# Patient Record
Sex: FEMALE | Race: AMERICAN INDIAN OR ALASKA NATIVE
[De-identification: names, ages, dates, MRNs, and addresses within clinical notes are randomized per-mention and may not be internally consistent; named-entity substitution may affect disease eponyms.]

---

## 2022-06-01 ENCOUNTER — HOSPITAL ENCOUNTER (OUTPATIENT)
Dept: HOSPITAL 5 - TRG | Age: 22
Discharge: HOME | End: 2022-06-01
Attending: OBSTETRICS & GYNECOLOGY
Payer: COMMERCIAL

## 2022-06-01 VITALS — DIASTOLIC BLOOD PRESSURE: 58 MMHG | SYSTOLIC BLOOD PRESSURE: 101 MMHG

## 2022-06-01 DIAGNOSIS — Z3A.38: ICD-10-CM

## 2022-06-01 DIAGNOSIS — E75.5: ICD-10-CM

## 2022-06-01 DIAGNOSIS — O26.893: Primary | ICD-10-CM

## 2022-06-01 LAB
BACTERIA #/AREA URNS HPF: (no result) /HPF
BILIRUB UR QL STRIP: (no result)
BLOOD UR QL VISUAL: (no result)
MUCOUS THREADS #/AREA URNS HPF: (no result) /HPF
PH UR STRIP: 7 [PH] (ref 5–7)
PROT UR STRIP-MCNC: (no result) MG/DL
RBC #/AREA URNS HPF: 3 /HPF (ref 0–6)
UROBILINOGEN UR-MCNC: < 2 MG/DL (ref ?–2)
WBC #/AREA URNS HPF: 2 /HPF (ref 0–6)

## 2022-06-01 PROCEDURE — 81001 URINALYSIS AUTO W/SCOPE: CPT

## 2022-06-01 PROCEDURE — 59025 FETAL NON-STRESS TEST: CPT

## 2022-06-07 ENCOUNTER — HOSPITAL ENCOUNTER (OUTPATIENT)
Dept: HOSPITAL 5 - TRG | Age: 22
Discharge: HOME | End: 2022-06-07
Attending: OBSTETRICS & GYNECOLOGY
Payer: COMMERCIAL

## 2022-06-07 VITALS — DIASTOLIC BLOOD PRESSURE: 65 MMHG | SYSTOLIC BLOOD PRESSURE: 106 MMHG

## 2022-06-07 DIAGNOSIS — Z3A.39: ICD-10-CM

## 2022-06-07 DIAGNOSIS — O26.853: Primary | ICD-10-CM

## 2022-06-07 PROCEDURE — 59025 FETAL NON-STRESS TEST: CPT

## 2022-06-07 PROCEDURE — 76815 OB US LIMITED FETUS(S): CPT

## 2022-06-07 PROCEDURE — 76819 FETAL BIOPHYS PROFIL W/O NST: CPT

## 2022-06-07 NOTE — ULTRASOUND REPORT
ULTRASOUND OBSTETRIC LIMITED 

ULTRASOUND FETAL BIOPHYSICAL PROFILE



INDICATION / CLINICAL INFORMATION: fetal well being.

Clinical Gestational Age (GA) in weeks, days: 39, 2 



TECHNIQUE: Transabdominal.



COMPARISON: None available.



FINDINGS:



FETAL BREATHING MOVEMENT = 2

GROSS BODY MOVEMENT = 2

FETAL TONE = 2

QUALITATIVE AMNIOTIC FLUID VOLUME = 2



TOTAL BIOPHYSICAL SCORE = 8/8



FETAL HEART RATE (beats per minute): 141 



AMNIOTIC FLUID INDEX (cm) =  11.6   (normal = 7-24 cm)

PRESENTATION: Cephalic. 



ADDITIONAL FINDINGS: None.



IMPRESSION:

1. Fetal Biophysical Score = 8/8



Signer Name: Wyatt Gray MD 

Signed: 6/7/2022 4:40 PM

Workstation Name: [email or web address]

## 2022-06-07 NOTE — ULTRASOUND REPORT
ULTRASOUND OBSTETRIC LIMITED 

ULTRASOUND FETAL BIOPHYSICAL PROFILE



INDICATION / CLINICAL INFORMATION: fetal well being.

Clinical Gestational Age (GA) in weeks, days: 39, 2 



TECHNIQUE: Transabdominal.



COMPARISON: None available.



FINDINGS:



FETAL BREATHING MOVEMENT = 2

GROSS BODY MOVEMENT = 2

FETAL TONE = 2

QUALITATIVE AMNIOTIC FLUID VOLUME = 2



TOTAL BIOPHYSICAL SCORE = 8/8



FETAL HEART RATE (beats per minute): 141 



AMNIOTIC FLUID INDEX (cm) =  11.6   (normal = 7-24 cm)

PRESENTATION: Cephalic. 



ADDITIONAL FINDINGS: None.



IMPRESSION:

1. Fetal Biophysical Score = 8/8



Signer Name: Wyatt Gray MD 

Signed: 6/7/2022 4:40 PM

Workstation Name: [id number]

## 2022-06-14 ENCOUNTER — HOSPITAL ENCOUNTER (OUTPATIENT)
Dept: HOSPITAL 5 - TRG | Age: 22
LOS: 1 days | Discharge: HOME | End: 2022-06-15
Attending: OBSTETRICS & GYNECOLOGY
Payer: COMMERCIAL

## 2022-06-14 DIAGNOSIS — O48.0: ICD-10-CM

## 2022-06-14 DIAGNOSIS — O99.513: ICD-10-CM

## 2022-06-14 DIAGNOSIS — O99.013: ICD-10-CM

## 2022-06-14 DIAGNOSIS — D64.9: ICD-10-CM

## 2022-06-14 DIAGNOSIS — J45.909: ICD-10-CM

## 2022-06-14 DIAGNOSIS — Z3A.40: ICD-10-CM

## 2022-06-15 VITALS — DIASTOLIC BLOOD PRESSURE: 58 MMHG | SYSTOLIC BLOOD PRESSURE: 104 MMHG

## 2022-06-19 ENCOUNTER — HOSPITAL ENCOUNTER (INPATIENT)
Dept: HOSPITAL 5 - TRG | Age: 22
LOS: 3 days | Discharge: HOME | End: 2022-06-22
Attending: OBSTETRICS & GYNECOLOGY | Admitting: OBSTETRICS & GYNECOLOGY
Payer: COMMERCIAL

## 2022-06-19 DIAGNOSIS — O48.0: ICD-10-CM

## 2022-06-19 DIAGNOSIS — Z91.013: ICD-10-CM

## 2022-06-19 DIAGNOSIS — Z91.048: ICD-10-CM

## 2022-06-19 DIAGNOSIS — Z91.040: ICD-10-CM

## 2022-06-19 DIAGNOSIS — Z20.822: ICD-10-CM

## 2022-06-19 DIAGNOSIS — Z3A.41: ICD-10-CM

## 2022-06-19 DIAGNOSIS — F32.9: ICD-10-CM

## 2022-06-19 DIAGNOSIS — Z91.018: ICD-10-CM

## 2022-06-19 LAB
BASOPHILS # (AUTO): 0 K/MM3 (ref 0–0.1)
BASOPHILS NFR BLD AUTO: 0.6 % (ref 0–1.8)
EOSINOPHIL # BLD AUTO: 0.1 K/MM3 (ref 0–0.4)
EOSINOPHIL NFR BLD AUTO: 1 % (ref 0–4.3)
HCT VFR BLD CALC: 36 % (ref 30.3–42.9)
HCT VFR BLD CALC: 37.1 % (ref 30.3–42.9)
HGB BLD-MCNC: 11.8 GM/DL (ref 10.1–14.3)
HGB BLD-MCNC: 11.9 GM/DL (ref 10.1–14.3)
LYMPHOCYTES # BLD AUTO: 1.6 K/MM3 (ref 1.2–5.4)
LYMPHOCYTES NFR BLD AUTO: 29.2 % (ref 13.4–35)
MCHC RBC AUTO-ENTMCNC: 32 % (ref 30–34)
MCHC RBC AUTO-ENTMCNC: 33 % (ref 30–34)
MCV RBC AUTO: 86 FL (ref 79–97)
MCV RBC AUTO: 87 FL (ref 79–97)
MONOCYTES # (AUTO): 0.6 K/MM3 (ref 0–0.8)
MONOCYTES % (AUTO): 11.5 % (ref 0–7.3)
PLATELET # BLD: 196 K/MM3 (ref 140–440)
PLATELET # BLD: 200 K/MM3 (ref 140–440)
RBC # BLD AUTO: 4.19 M/MM3 (ref 3.65–5.03)
RBC # BLD AUTO: 4.28 M/MM3 (ref 3.65–5.03)

## 2022-06-19 PROCEDURE — 85014 HEMATOCRIT: CPT

## 2022-06-19 PROCEDURE — 90715 TDAP VACCINE 7 YRS/> IM: CPT

## 2022-06-19 PROCEDURE — 86900 BLOOD TYPING SEROLOGIC ABO: CPT

## 2022-06-19 PROCEDURE — 59025 FETAL NON-STRESS TEST: CPT

## 2022-06-19 PROCEDURE — 36415 COLL VENOUS BLD VENIPUNCTURE: CPT

## 2022-06-19 PROCEDURE — 86901 BLOOD TYPING SEROLOGIC RH(D): CPT

## 2022-06-19 PROCEDURE — U0003 INFECTIOUS AGENT DETECTION BY NUCLEIC ACID (DNA OR RNA); SEVERE ACUTE RESPIRATORY SYNDROME CORONAVIRUS 2 (SARS-COV-2) (CORONAVIRUS DISEASE [COVID-19]), AMPLIFIED PROBE TECHNIQUE, MAKING USE OF HIGH THROUGHPUT TECHNOLOGIES AS DESCRIBED BY CMS-2020-01-R: HCPCS

## 2022-06-19 PROCEDURE — 99211 OFF/OP EST MAY X REQ PHY/QHP: CPT

## 2022-06-19 PROCEDURE — 85018 HEMOGLOBIN: CPT

## 2022-06-19 PROCEDURE — 86592 SYPHILIS TEST NON-TREP QUAL: CPT

## 2022-06-19 PROCEDURE — 96360 HYDRATION IV INFUSION INIT: CPT

## 2022-06-19 PROCEDURE — G0463 HOSPITAL OUTPT CLINIC VISIT: HCPCS

## 2022-06-19 PROCEDURE — 86850 RBC ANTIBODY SCREEN: CPT

## 2022-06-19 PROCEDURE — 85025 COMPLETE CBC W/AUTO DIFF WBC: CPT

## 2022-06-19 PROCEDURE — 85027 COMPLETE CBC AUTOMATED: CPT

## 2022-06-19 NOTE — HISTORY AND PHYSICAL REPORT
History of Present Illness


Date of examination: 22


Chief complaint: 


41 wks; contractions, "feeling wet down there"





History of present illness: 


EDC Calculations by LMP: 2022








Past Medical History:


   Reviewed history from 2021 and no changes required:


      Anemia - takes iron


      Asthma - exercise-induced


      Mirgraines - f/u with neurologist





Past Surgical History:


   Reviewed history from 2021 and no changes required:


      negative

















Risk Factors: 


Smoked Tobacco Use:  Never smoker


Smokeless Tobacco Use:  Never


HIV High Risk Behavior:  no


Seatbelt Use:  100 %





No Dietary Counseling Reason: pn yes





Alcohol Use:  no





Drug Use:  yes


   Drug of Choice:  marijuana


   Comments/Other Substances:  Occasional use 








Past Medical History 


Anesthesia Complications: negative


Anemia: negative


Autoimmune Disorder: negative


Bleeding Disorder: negative


Blood Transfusions: negative


Breast Disease: negative


Diabetes: negative


Heart Disease: negative


Hypertension: negative


Hepatitis/Liver Disease: negative


Kidney Disease/UTI: negative


Neurologic/Epilepsy/Migraines: negative


Phlebitis/Varicosities: negative


Psychiatric: negative


Pulmonary Disease/Asthma: negative


Thyroid Disease: negative


Hospitalizations: negative


Surgery (Non-gyn): negative


Abnormal PAP: negative


Uterine Anomaly: negative


Uterine Surgery (not C/S): negative


Other Gynecologic Problems: negative





Social Hx: Patient is single





Smoking History:


Patient has never smoked.








Infection History 


Hx of STD: chlamydia


HIV Risk Eval: no


Personal hx. of genital herpes: no


Infection History Comments: Also notes h/o gonorrhea





Genetic History 


 Congenital Heart Defect:


    Mom: no


Canavan Disease:


    Mom: no


Thalassemia


    Mom: no


Neural Tube Defect


    Mom: no


Down's Syndrome


    Mom: no


Shiva-Sachs


    Mom: no


Sickle Cell Disease/Trait


    Mom: no


Hemophilia


    Mom: no


Muscular Dystrophy


    Mom: no


Cystic Fibrosis


    Mom: no


[place] Chorea


    Mom: no


Mental Retardation


    Mom: no


Fragile X


    Mom: no


Other Genetic/Chromosomal Disorder


    Mom: no


Child w/other birth defect


    Mom: no


Active Medications:


EYE DROPS () 


SINGULAIR TABLET (MONTELUKAST SODIUM TABS) 


FLONASE SENSIMIST SUSPENSION (FLUTICASONE FUROATE SUSP) 


AMITRIPTYLINE HCL TABLET (AMITRIPTYLINE HCL TABS) 


HYDROXYZINE HCL TABLET (HYDROXYZINE HCL TABS) 


ZOLOFT 50 MG ORAL TABLET (SERTRALINE HCL) 





Current Allergies:


* SHELLFISH (Critical)


* WATERMELON (Critical)


* NUTS (Critical)


* MUSTARD (Critical)


* LATEX (Critical)








Past History


Past Medical History: other (see HPI)


Past Surgical History: other (see HPI)


GYN History: other (see HPI)


Family/Genetic History: other (see HPI)


Social history: no significant social history





- Obstetrical History


Expected Date of Delivery: 22


Actual Gestation: 41 Week(s) 0 Day(s) 


: 1


Para: 0


Hx # Term Pregnancies: 0


Number of  Pregnancies: 0


Spontaneous Abortions: 0


Induced : 0


Number of Living Children: 0





Medications and Allergies


                                    Allergies











Allergy/AdvReac Type Severity Reaction Status Date / Time


 


Latex, Natural Rubber AdvReac Severe Anaphylaxis Verified 22 13:14











                                Home Medications











 Medication  Instructions  Recorded  Confirmed  Last Taken  Type


 


HYDROcodone/APAP 5-325 [Antoine 1 each PO Q6HR PRN #10 tablet 14  Unknown Rx





5-325 mg TAB]     


 


Ondansetron [Zofran Odt] 4 mg PO Q6H PRN #8 tab.rapdis 14  Unknown Rx











Active Meds: 


Active Medications





Acetaminophen (Acetaminophen 325 Mg Tab)  650 mg PO Q4H PRN


   PRN Reason: Pain, Mild (1-3)


Carboprost Tromethamine (Carboprost Tromethamine 250 Mcg/1 Ml Inj)  250 mcg IM 

ONCE PRN


   PRN Reason: Uterine Bleeding


Ephedrine Sulfate (Ephedrine Sulfate 50 Mg/1 Ml Inj)  10 mg IV Q2M PRN


   PRN Reason: Hypotension


Hydroxyzine Pamoate (Hydroxyzine Pamoate 50 Mg Cap)  100 mg PO ONCE ONE


   Stop: 22 09:59


Oxytocin/Sodium Chloride (Pitocin/Ns 30 Unit/500ml)  30 units in 500 mls @ 2 

mls/hr IV TITR JOHANA; Protocol


Lactated Ringer's (Lactated Ringers)  1,000 mls @ 125 mls/hr IV AS DIRECT JOHANA


Oxytocin/Sodium Chloride (Pitocin/Ns 30 Unit/500ml)  30 units in 500 mls @ 40 

mls/hr IV TITR JOHANA; Protocol


Lidocaine (Lidocaine (2%) 20 Mg/1 Ml Vial 20 Ml Mdv)  20 ml INFILTRATI ONCE ONE


   Stop: 22 09:51


Loperamide HCl (Loperamide 2 Mg Cap)  2 mg PO ONCE PRN


   PRN Reason: give with Hemabate


Methylergonovine Maleate (Methylergonovine Maleate 0.2 Mg/Ml Vial)  0.2 mg IM 

ONCE PRN


   PRN Reason: Uterine Bleeding


Mineral Oil (Mineral Oil 30 Ml Oral Liqd)  30 ml PO QHS PRN


   PRN Reason: Constipation


Misoprostol (Misoprostol 200 Mcg Tab)  800 mcg GA ONCE PRN


   PRN Reason: Uterine Bleeding


Ondansetron HCl (Ondansetron 4 Mg/2 Ml Inj)  4 mg IV Q8H PRN


   PRN Reason: Nausea And Vomiting


Oxytocin (Oxytocin 10 Unit/1 Ml Inj)  10 unit IM ONCE PRN


   PRN Reason: Uterine Bleeding


Terbutaline Sulfate (Terbutaline 1 Mg/1 Ml Inj)  0.25 mg SUB-Q ONCE PRN


   PRN Reason: Hyperstimulation/Hypertonicity











Review of Systems


All systems: negative





- Vital Signs


Vital signs: 


                                   Vital Signs











Pulse BP Pulse Ox


 


 90   103/71   97 


 


 22 05:39  22 05:39  22 05:39








                                        











Temp Pulse Resp BP Pulse Ox


 


 97.9 F   92 H     106/69   89 


 


 22 05:43  22 09:56     22 09:26  22 09:56














- Physical Exam


Breasts: Positive: normal


Cardiovascular: Regular rate


Lungs: Positive: Normal air movement


Abdomen: Positive: normal appearance, soft


Genitourinary (Female): Positive: normal external genitalia, normal perenium


Vulva: both: normal


Vagina: Positive: normal moisture


Uterus: Positive: normal size


Anus/Rectum: Positive: normal perianal skin


Extremities: Positive: normal





- Obstetrical


FHR: category 1


Uterine Contraction Monitor Mode: External


Cervical Dilatation: 0


Uterine Contraction Pattern: Irregular


Uterine Tone Measurement Phase: Contraction


Uterine Contraction Intensity: Mild





Results


Result Diagrams: 


                                 22 10:37





All other labs normal.








Assessment and Plan


 21y/o  presented for a labor check @ 41+0 weeks. she is scheduled for IOL 

tomorrow. SVE not laboring at this time, no leaking noted. Ctx are present in a 

semi-regular pattern occurring every 2-5 minutes.  Pt to be admitted for 

monitoring until which time staffing can accommodate her IOL. pt ok to have 

regular diet and ambulateif she desires as long as NST reactive. all questions 

addressed, pt verbalizes understanding.








- Patient Problems


(1) Post term pregnancy, 41 weeks


Current Visit: Yes   Status: Acute

## 2022-06-20 RX ADMIN — FENTANYL CITRATE PRN MCG: 50 INJECTION, SOLUTION INTRAMUSCULAR; INTRAVENOUS at 18:46

## 2022-06-20 RX ADMIN — AMPICILLIN SODIUM SCH: 1 INJECTION, POWDER, FOR SOLUTION INTRAMUSCULAR; INTRAVENOUS at 21:20

## 2022-06-20 RX ADMIN — AMPICILLIN SODIUM SCH: 1 INJECTION, POWDER, FOR SOLUTION INTRAMUSCULAR; INTRAVENOUS at 21:21

## 2022-06-20 RX ADMIN — FENTANYL CITRATE PRN MCG: 50 INJECTION, SOLUTION INTRAMUSCULAR; INTRAVENOUS at 06:33

## 2022-06-20 RX ADMIN — AMPICILLIN SODIUM SCH MLS/HR: 1 INJECTION, POWDER, FOR SOLUTION INTRAMUSCULAR; INTRAVENOUS at 21:21

## 2022-06-20 RX ADMIN — FENTANYL CITRATE PRN MCG: 50 INJECTION, SOLUTION INTRAMUSCULAR; INTRAVENOUS at 02:03

## 2022-06-20 RX ADMIN — AMPICILLIN SODIUM SCH MLS/HR: 1 INJECTION, POWDER, FOR SOLUTION INTRAMUSCULAR; INTRAVENOUS at 16:44

## 2022-06-20 NOTE — PROGRESS NOTE
Assessment and Plan


A: 22 y.o. @ 41.l, IOL d/t postdates. Meconium stained fluid. 








- Patient Problems


(1) Post term pregnancy, 41 weeks


Current Visit: Yes   Status: Acute   


Plan to address problem: 


Continue with IOL.


 [facility] team for delivery d/t mec stained fluid at [facility]. 


 Anticipate . 








Subjective





- Subjective


Date of service: 22


Principal diagnosis: IOL, 41.1 wks


Interval history: 


Pt comfortable with epidural. 





Patient reports: other (Vaginal pressure)





Objective





- Vital Signs


Vital Signs: 


                               Vital Signs - 12hr











  22





  10:07 10:08 10:13


 


Temperature   


 


Pulse Rate  95 H 87


 


Respiratory 16  





Rate   


 


Blood Pressure   


 


Blood Pressure   





[Right]   


 


O2 Sat by Pulse  100 98





Oximetry   


 


O2 Sat by Pulse   





Oximetry [   





Anterior   





Bilateral   





Throughout]   














  22





  10:14 10:15 10:18


 


Temperature   


 


Pulse Rate 84 90 93 H


 


Respiratory   





Rate   


 


Blood Pressure 112/77  


 


Blood Pressure   





[Right]   


 


O2 Sat by Pulse  94 97





Oximetry   


 


O2 Sat by Pulse   





Oximetry [   





Anterior   





Bilateral   





Throughout]   














  22





  10:23 10:24 10:28


 


Temperature   


 


Pulse Rate 75 74 82


 


Respiratory   





Rate   


 


Blood Pressure   


 


Blood Pressure   





[Right]   


 


O2 Sat by Pulse 94 93 98





Oximetry   


 


O2 Sat by Pulse   





Oximetry [   





Anterior   





Bilateral   





Throughout]   














  22





  10:29 10:30 10:33


 


Temperature   


 


Pulse Rate 80 83 73


 


Respiratory   





Rate   


 


Blood Pressure 114/85  


 


Blood Pressure   





[Right]   


 


O2 Sat by Pulse  94 95





Oximetry   


 


O2 Sat by Pulse   





Oximetry [   





Anterior   





Bilateral   





Throughout]   














  22





  10:38 10:43 10:45


 


Temperature   


 


Pulse Rate 88 81 73


 


Respiratory   





Rate   


 


Blood Pressure   107/66


 


Blood Pressure   





[Right]   


 


O2 Sat by Pulse 97 97 92





Oximetry   


 


O2 Sat by Pulse   





Oximetry [   





Anterior   





Bilateral   





Throughout]   














  22





  10:48 10:53 10:56


 


Temperature   


 


Pulse Rate 63 84 77


 


Respiratory   





Rate   


 


Blood Pressure   


 


Blood Pressure   





[Right]   


 


O2 Sat by Pulse 98 99 92





Oximetry   


 


O2 Sat by Pulse   





Oximetry [   





Anterior   





Bilateral   





Throughout]   














  22





  10:58 11:00 11:03


 


Temperature   


 


Pulse Rate 73 69 76


 


Respiratory   





Rate   


 


Blood Pressure  107/64 


 


Blood Pressure   





[Right]   


 


O2 Sat by Pulse 97  96





Oximetry   


 


O2 Sat by Pulse   





Oximetry [   





Anterior   





Bilateral   





Throughout]   














  22





  11:08 11:09 11:14


 


Temperature   


 


Pulse Rate 89  


 


Respiratory   





Rate   


 


Blood Pressure   


 


Blood Pressure   





[Right]   


 


O2 Sat by Pulse 98 81 L 84





Oximetry   


 


O2 Sat by Pulse   





Oximetry [   





Anterior   





Bilateral   





Throughout]   














  22





  11:15 11:20 11:25


 


Temperature   


 


Pulse Rate 111 H 67 71


 


Respiratory   





Rate   


 


Blood Pressure   


 


Blood Pressure   





[Right]   


 


O2 Sat by Pulse 88 89 95





Oximetry   


 


O2 Sat by Pulse   





Oximetry [   





Anterior   





Bilateral   





Throughout]   














  22





  11:26 11:30 11:32


 


Temperature   


 


Pulse Rate 77 72 71


 


Respiratory   





Rate   


 


Blood Pressure  108/64 


 


Blood Pressure   





[Right]   


 


O2 Sat by Pulse 94 96 94





Oximetry   


 


O2 Sat by Pulse   





Oximetry [   





Anterior   





Bilateral   





Throughout]   














  22





  11:35 11:40 11:45


 


Temperature   


 


Pulse Rate 79 84 69


 


Respiratory   





Rate   


 


Blood Pressure   


 


Blood Pressure   





[Right]   


 


O2 Sat by Pulse 97 97 98





Oximetry   


 


O2 Sat by Pulse   





Oximetry [   





Anterior   





Bilateral   





Throughout]   














  22





  11:46 11:50 11:55


 


Temperature   


 


Pulse Rate 67 77 85


 


Respiratory   





Rate   


 


Blood Pressure 108/67  


 


Blood Pressure   





[Right]   


 


O2 Sat by Pulse  99 97





Oximetry   


 


O2 Sat by Pulse   





Oximetry [   





Anterior   





Bilateral   





Throughout]   














  22





  11:59 12:00 12:03


 


Temperature  97.4 F L 


 


Pulse Rate 75 71 73


 


Respiratory   





Rate   


 


Blood Pressure 114/72  


 


Blood Pressure   





[Right]   


 


O2 Sat by Pulse  96 93





Oximetry   


 


O2 Sat by Pulse   





Oximetry [   





Anterior   





Bilateral   





Throughout]   














  22





  12:05 12:10 12:15


 


Temperature   


 


Pulse Rate 74 80 88


 


Respiratory   





Rate   


 


Blood Pressure   127/76


 


Blood Pressure   





[Right]   


 


O2 Sat by Pulse 96 99 98





Oximetry   


 


O2 Sat by Pulse   





Oximetry [   





Anterior   





Bilateral   





Throughout]   














  22





  12:20 12:26 12:31


 


Temperature   


 


Pulse Rate 86 90 62


 


Respiratory   





Rate   


 


Blood Pressure   


 


Blood Pressure   





[Right]   


 


O2 Sat by Pulse 98 85 61 L





Oximetry   


 


O2 Sat by Pulse   





Oximetry [   





Anterior   





Bilateral   





Throughout]   














  22





  12:32 12:37 12:42


 


Temperature   


 


Pulse Rate 81 82 79


 


Respiratory   





Rate   


 


Blood Pressure   


 


Blood Pressure   





[Right]   


 


O2 Sat by Pulse 96 100 97





Oximetry   


 


O2 Sat by Pulse   





Oximetry [   





Anterior   





Bilateral   





Throughout]   














  22





  12:45 12:47 12:52


 


Temperature   


 


Pulse Rate 73 76 79


 


Respiratory   





Rate   


 


Blood Pressure 113/66  


 


Blood Pressure   





[Right]   


 


O2 Sat by Pulse  97 97





Oximetry   


 


O2 Sat by Pulse   





Oximetry [   





Anterior   





Bilateral   





Throughout]   














  22





  12:57 12:59 13:02


 


Temperature   


 


Pulse Rate 72 74 71


 


Respiratory   





Rate   


 


Blood Pressure  114/71 


 


Blood Pressure   





[Right]   


 


O2 Sat by Pulse 98  98





Oximetry   


 


O2 Sat by Pulse   





Oximetry [   





Anterior   





Bilateral   





Throughout]   














  22





  13:07 13:12 13:14


 


Temperature   


 


Pulse Rate 76 75 82


 


Respiratory   





Rate   


 


Blood Pressure   119/77


 


Blood Pressure   





[Right]   


 


O2 Sat by Pulse 100 94 





Oximetry   


 


O2 Sat by Pulse   





Oximetry [   





Anterior   





Bilateral   





Throughout]   














  22





  13:17 13:22 13:27


 


Temperature   


 


Pulse Rate 95 H 88 88


 


Respiratory   





Rate   


 


Blood Pressure   


 


Blood Pressure   





[Right]   


 


O2 Sat by Pulse 99 97 98





Oximetry   


 


O2 Sat by Pulse   





Oximetry [   





Anterior   





Bilateral   





Throughout]   














  22





  13:29 13:32 13:37


 


Temperature   


 


Pulse Rate 85 74 84


 


Respiratory   





Rate   


 


Blood Pressure 117/79  


 


Blood Pressure   





[Right]   


 


O2 Sat by Pulse  97 98





Oximetry   


 


O2 Sat by Pulse   





Oximetry [   





Anterior   





Bilateral   





Throughout]   














  22





  13:42 13:45 13:47


 


Temperature   


 


Pulse Rate 94 H 89 94 H


 


Respiratory   





Rate   


 


Blood Pressure  119/84 


 


Blood Pressure   





[Right]   


 


O2 Sat by Pulse 98  98





Oximetry   


 


O2 Sat by Pulse   





Oximetry [   





Anterior   





Bilateral   





Throughout]   














  22





  13:52 13:57 13:59


 


Temperature   


 


Pulse Rate 89 82 85


 


Respiratory   





Rate   


 


Blood Pressure   111/74


 


Blood Pressure   





[Right]   


 


O2 Sat by Pulse 98 98 





Oximetry   


 


O2 Sat by Pulse   





Oximetry [   





Anterior   





Bilateral   





Throughout]   














  22





  14:02 14:07 14:12


 


Temperature   


 


Pulse Rate 81 86 85


 


Respiratory   





Rate   


 


Blood Pressure   


 


Blood Pressure   





[Right]   


 


O2 Sat by Pulse 98 97 97





Oximetry   


 


O2 Sat by Pulse   





Oximetry [   





Anterior   





Bilateral   





Throughout]   














  22





  14:17 14:22 14:27


 


Temperature   


 


Pulse Rate 78 75 76


 


Respiratory   





Rate   


 


Blood Pressure   


 


Blood Pressure   





[Right]   


 


O2 Sat by Pulse 97 97 97





Oximetry   


 


O2 Sat by Pulse   





Oximetry [   





Anterior   





Bilateral   





Throughout]   














  22





  14:30 14:32 14:37


 


Temperature   


 


Pulse Rate 75 69 74


 


Respiratory   





Rate   


 


Blood Pressure 108/67  


 


Blood Pressure   





[Right]   


 


O2 Sat by Pulse  97 96





Oximetry   


 


O2 Sat by Pulse   





Oximetry [   





Anterior   





Bilateral   





Throughout]   














  22





  14:48 14:53 14:58


 


Temperature   


 


Pulse Rate 80 75 74


 


Respiratory   





Rate   


 


Blood Pressure   


 


Blood Pressure   





[Right]   


 


O2 Sat by Pulse 98 97 97





Oximetry   


 


O2 Sat by Pulse   





Oximetry [   





Anterior   





Bilateral   





Throughout]   














  22





  15:00 15:03 15:08


 


Temperature   


 


Pulse Rate 68 71 68


 


Respiratory   





Rate   


 


Blood Pressure 105/63  


 


Blood Pressure   





[Right]   


 


O2 Sat by Pulse  97 97





Oximetry   


 


O2 Sat by Pulse   





Oximetry [   





Anterior   





Bilateral   





Throughout]   














  22





  15:13 15:18 15:23


 


Temperature   


 


Pulse Rate 71 71 68


 


Respiratory   





Rate   


 


Blood Pressure   


 


Blood Pressure   





[Right]   


 


O2 Sat by Pulse 98 98 97





Oximetry   


 


O2 Sat by Pulse   





Oximetry [   





Anterior   





Bilateral   





Throughout]   














  22





  15:28 15:30 15:33


 


Temperature   


 


Pulse Rate 72 71 74


 


Respiratory   





Rate   


 


Blood Pressure  115/65 


 


Blood Pressure   





[Right]   


 


O2 Sat by Pulse 98  97





Oximetry   


 


O2 Sat by Pulse   





Oximetry [   





Anterior   





Bilateral   





Throughout]   














  22





  15:38 15:43 15:48


 


Temperature   


 


Pulse Rate 64 76 70


 


Respiratory   





Rate   


 


Blood Pressure   


 


Blood Pressure   





[Right]   


 


O2 Sat by Pulse 98 98 97





Oximetry   


 


O2 Sat by Pulse   





Oximetry [   





Anterior   





Bilateral   





Throughout]   














  06/22





  15:53 16:02 16:04


 


Temperature   


 


Pulse Rate 76 82 83


 


Respiratory   





Rate   


 


Blood Pressure   111/68


 


Blood Pressure   





[Right]   


 


O2 Sat by Pulse 98 99 





Oximetry   


 


O2 Sat by Pulse   





Oximetry [   





Anterior   





Bilateral   





Throughout]   














  22





  16:07 16:12 16:17


 


Temperature 98.2 F  


 


Pulse Rate 77 78 74


 


Respiratory 18  





Rate   


 


Blood Pressure   


 


Blood Pressure 111/68  





[Right]   


 


O2 Sat by Pulse 98 98 97





Oximetry   


 


O2 Sat by Pulse   





Oximetry [   





Anterior   





Bilateral   





Throughout]   














  22





  16:22 16:27 16:31


 


Temperature   


 


Pulse Rate 68 71 73


 


Respiratory   





Rate   


 


Blood Pressure   111/65


 


Blood Pressure   





[Right]   


 


O2 Sat by Pulse 97 98 





Oximetry   


 


O2 Sat by Pulse   





Oximetry [   





Anterior   





Bilateral   





Throughout]   














  22





  16:32 16:37 16:42


 


Temperature   


 


Pulse Rate 69 76 95 H


 


Respiratory   





Rate   


 


Blood Pressure   


 


Blood Pressure   





[Right]   


 


O2 Sat by Pulse 98 97 99





Oximetry   


 


O2 Sat by Pulse   





Oximetry [   





Anterior   





Bilateral   





Throughout]   














  22





  16:47 16:52 16:57


 


Temperature   


 


Pulse Rate 74 86 79


 


Respiratory   





Rate   


 


Blood Pressure   


 


Blood Pressure   





[Right]   


 


O2 Sat by Pulse 98 98 97





Oximetry   


 


O2 Sat by Pulse   





Oximetry [   





Anterior   





Bilateral   





Throughout]   














  22





  17:12 17:17 17:22


 


Temperature   


 


Pulse Rate 87 70 79


 


Respiratory   





Rate   


 


Blood Pressure   


 


Blood Pressure   





[Right]   


 


O2 Sat by Pulse 97 97 98





Oximetry   


 


O2 Sat by Pulse   





Oximetry [   





Anterior   





Bilateral   





Throughout]   














  22





  17:27 17:30 17:32


 


Temperature   


 


Pulse Rate 78 77 87


 


Respiratory   





Rate   


 


Blood Pressure  112/64 


 


Blood Pressure   





[Right]   


 


O2 Sat by Pulse 97  96





Oximetry   


 


O2 Sat by Pulse   





Oximetry [   





Anterior   





Bilateral   





Throughout]   














  22





  17:37 17:42 17:47


 


Temperature   


 


Pulse Rate 79 87 93 H


 


Respiratory   





Rate   


 


Blood Pressure   


 


Blood Pressure   





[Right]   


 


O2 Sat by Pulse 100 98 98





Oximetry   


 


O2 Sat by Pulse   





Oximetry [   





Anterior   





Bilateral   





Throughout]   














  06/20/22 06/20/22 06/20/22





  17:52 18:02 18:07


 


Temperature   


 


Pulse Rate 90 85 86


 


Respiratory   





Rate   


 


Blood Pressure   


 


Blood Pressure   





[Right]   


 


O2 Sat by Pulse 99 99 98





Oximetry   


 


O2 Sat by Pulse   





Oximetry [   





Anterior   





Bilateral   





Throughout]   














  22





  18:12 18:17 18:22


 


Temperature   


 


Pulse Rate 94 H 84 90


 


Respiratory   





Rate   


 


Blood Pressure   


 


Blood Pressure   





[Right]   


 


O2 Sat by Pulse 98 98 98





Oximetry   


 


O2 Sat by Pulse   





Oximetry [   





Anterior   





Bilateral   





Throughout]   














  22





  18:27 18:32 18:37


 


Temperature   


 


Pulse Rate 96 H 93 H 76


 


Respiratory   





Rate   


 


Blood Pressure   


 


Blood Pressure   





[Right]   


 


O2 Sat by Pulse 96 97 99





Oximetry   


 


O2 Sat by Pulse   





Oximetry [   





Anterior   





Bilateral   





Throughout]   














  22





  18:42 18:47 18:52


 


Temperature   


 


Pulse Rate 96 H 83 79


 


Respiratory   





Rate   


 


Blood Pressure   


 


Blood Pressure   





[Right]   


 


O2 Sat by Pulse 100 98 95





Oximetry   


 


O2 Sat by Pulse   





Oximetry [   





Anterior   





Bilateral   





Throughout]   














  22





  18:53 18:55 18:57


 


Temperature   


 


Pulse Rate 78 81 83


 


Respiratory   





Rate   


 


Blood Pressure  103/64 


 


Blood Pressure   





[Right]   


 


O2 Sat by Pulse 91  95





Oximetry   


 


O2 Sat by Pulse   





Oximetry [   





Anterior   





Bilateral   





Throughout]   














  22





  19:01 19:02 19:07


 


Temperature   


 


Pulse Rate 82 71 95 H


 


Respiratory   





Rate   


 


Blood Pressure 104/63  


 


Blood Pressure   





[Right]   


 


O2 Sat by Pulse  95 97





Oximetry   


 


O2 Sat by Pulse   





Oximetry [   





Anterior   





Bilateral   





Throughout]   














  22





  19:12 19:17 19:22


 


Temperature   


 


Pulse Rate 78 80 91 H


 


Respiratory   





Rate   


 


Blood Pressure   


 


Blood Pressure   





[Right]   


 


O2 Sat by Pulse 97 99 97





Oximetry   


 


O2 Sat by Pulse   





Oximetry [   





Anterior   





Bilateral   





Throughout]   














  22





  19:23 19:27 19:35


 


Temperature   


 


Pulse Rate 68 85 95 H


 


Respiratory 17  





Rate   


 


Blood Pressure 109/68  


 


Blood Pressure 109/68  





[Right]   


 


O2 Sat by Pulse 97 97 98





Oximetry   


 


O2 Sat by Pulse   





Oximetry [   





Anterior   





Bilateral   





Throughout]   














  22





  19:40 19:45 19:50


 


Temperature   


 


Pulse Rate 84 85 82


 


Respiratory   





Rate   


 


Blood Pressure   


 


Blood Pressure   





[Right]   


 


O2 Sat by Pulse 97 98 97





Oximetry   


 


O2 Sat by Pulse   





Oximetry [   





Anterior   





Bilateral   





Throughout]   














  22





  19:55 20:00 20:04


 


Temperature   


 


Pulse Rate 89 88 


 


Respiratory   





Rate   


 


Blood Pressure  120/70 


 


Blood Pressure   





[Right]   


 


O2 Sat by Pulse 99 98 





Oximetry   


 


O2 Sat by Pulse   97





Oximetry [   





Anterior   





Bilateral   





Throughout]   














  22





  20:05 20:10 20:15


 


Temperature 98.2 F  


 


Pulse Rate 81 87 99 H


 


Respiratory   





Rate   


 


Blood Pressure   


 


Blood Pressure   





[Right]   


 


O2 Sat by Pulse 99 96 99





Oximetry   


 


O2 Sat by Pulse   





Oximetry [   





Anterior   





Bilateral   





Throughout]   














  22





  20:20 20:29 20:31


 


Temperature   


 


Pulse Rate 92 H 85 89


 


Respiratory   





Rate   


 


Blood Pressure   120/71


 


Blood Pressure   





[Right]   


 


O2 Sat by Pulse 99 96 





Oximetry   


 


O2 Sat by Pulse   





Oximetry [   





Anterior   





Bilateral   





Throughout]   














  22





  20:34 20:39 20:42


 


Temperature   


 


Pulse Rate 81 79 73


 


Respiratory   





Rate   


 


Blood Pressure   116/75


 


Blood Pressure   





[Right]   


 


O2 Sat by Pulse 97 96 





Oximetry   


 


O2 Sat by Pulse   





Oximetry [   





Anterior   





Bilateral   





Throughout]   














  22





  20:44 20:49 20:51


 


Temperature   


 


Pulse Rate 86 75 88


 


Respiratory   





Rate   


 


Blood Pressure  104/74 104/62


 


Blood Pressure   





[Right]   


 


O2 Sat by Pulse 97 97 





Oximetry   


 


O2 Sat by Pulse   





Oximetry [   





Anterior   





Bilateral   





Throughout]   














  22





  20:53 20:54 20:55


 


Temperature   


 


Pulse Rate 86 97 H 77


 


Respiratory   





Rate   


 


Blood Pressure 101/67  103/63


 


Blood Pressure   





[Right]   


 


O2 Sat by Pulse  97 





Oximetry   


 


O2 Sat by Pulse   





Oximetry [   





Anterior   





Bilateral   





Throughout]   














  22





  20:57 20:59 21:01


 


Temperature   


 


Pulse Rate 90 82 75


 


Respiratory   





Rate   


 


Blood Pressure 95/61 95/61 100/61


 


Blood Pressure   





[Right]   


 


O2 Sat by Pulse  98 





Oximetry   


 


O2 Sat by Pulse   





Oximetry [   





Anterior   





Bilateral   





Throughout]   














  22





  21:03 21:04 21:05


 


Temperature   


 


Pulse Rate 83 75 77


 


Respiratory   





Rate   


 


Blood Pressure 97/62  98/58


 


Blood Pressure   





[Right]   


 


O2 Sat by Pulse  98 





Oximetry   


 


O2 Sat by Pulse   





Oximetry [   





Anterior   





Bilateral   





Throughout]   














  22





  21:07 21:09 21:11


 


Temperature   


 


Pulse Rate 77 80 75


 


Respiratory   





Rate   


 


Blood Pressure 97/60 101/64 95/59


 


Blood Pressure   





[Right]   


 


O2 Sat by Pulse  99 





Oximetry   


 


O2 Sat by Pulse   





Oximetry [   





Anterior   





Bilateral   





Throughout]   














  22





  21:13 21:14 21:15


 


Temperature   


 


Pulse Rate 71 83 67


 


Respiratory   





Rate   


 


Blood Pressure 98/62  101/65


 


Blood Pressure   





[Right]   


 


O2 Sat by Pulse  99 





Oximetry   


 


O2 Sat by Pulse   





Oximetry [   





Anterior   





Bilateral   





Throughout]   














  22





  21:17 21:19 21:24


 


Temperature   


 


Pulse Rate 86 81 64


 


Respiratory   





Rate   


 


Blood Pressure 100/65 103/67 


 


Blood Pressure   





[Right]   


 


O2 Sat by Pulse  98 99





Oximetry   


 


O2 Sat by Pulse   





Oximetry [   





Anterior   





Bilateral   





Throughout]   














  22





  21:29 21:34 21:39


 


Temperature   


 


Pulse Rate 76 64 78


 


Respiratory   





Rate   


 


Blood Pressure  106/64 


 


Blood Pressure   





[Right]   


 


O2 Sat by Pulse 99 98 98





Oximetry   


 


O2 Sat by Pulse   





Oximetry [   





Anterior   





Bilateral   





Throughout]   














  22





  21:44 21:49 21:50


 


Temperature   


 


Pulse Rate 90 94 H 94 H


 


Respiratory   





Rate   


 


Blood Pressure   112/71


 


Blood Pressure   





[Right]   


 


O2 Sat by Pulse 98 99 





Oximetry   


 


O2 Sat by Pulse   





Oximetry [   





Anterior   





Bilateral   





Throughout]   














  22





  21:54 21:59


 


Temperature  


 


Pulse Rate 81 89


 


Respiratory  





Rate  


 


Blood Pressure  


 


Blood Pressure  





[Right]  


 


O2 Sat by Pulse 99 96





Oximetry  


 


O2 Sat by Pulse  





Oximetry [  





Anterior  





Bilateral  





Throughout]  














- Exam


Narrative Exam: 


[facility] for meconium stained fluid. 





Cardiovascular: Regular rate


Lungs: Normal air movement


Abdomen: Present: normal appearance


Vulva: both: normal


FHR: category 1


Uterine Contraction Monitor Mode: External


Cervical Dilatation: 4 ([facility]: Mec stanined)


Cervical Effacement Percentage: 90


Fetal station: -1


Uterine Contraction Pattern: Regular


Uterine Tone Measurement Phase: Resting


Uterine Contraction Intensity: Moderate


Extremities: normal





- Labs


Labs: 


                                  Abnormal Labs











  22





  17:54


 


Mono % (Auto)  11.5 H

## 2022-06-20 NOTE — PROGRESS NOTE
Assessment and Plan





- Patient Problems


(1) Post term pregnancy, 41 weeks


Current Visit: Yes   Status: Acute   


Plan to address problem: 


Will remove Cervidil this morning discussed with the patient the nature of 

serial induction.  Questions answered.  Discussed the risks of and. indications 

for operative intervention.  Will continue induction until this evening.  At 

that time reassess if labor has not started we'll stop induction.  We'll allow 

to eat and possibly repeat  Cervidil this evening.  If labor is progressing or 

other indications to continue induction we'll do so at that time.  Also 

discussed with the patient possibility of macrosomia and did discuss the risks 

of arrested labor with this indication of  section.








Subjective





- Subjective


Date of service: 22


Principal diagnosis: Postdates pregnancy


Interval history: 





Patient was given Cervidil overnight


Patient reports: contractions (Starting this morning)





Objective





- Vital Signs


Vital Signs: 


                               Vital Signs - 12hr











  22





  21:26 21:31 21:36


 


Temperature   


 


Pulse Rate 77 90 76


 


Respiratory   





Rate   


 


Blood Pressure   


 


O2 Sat by Pulse 100 100 100





Oximetry   














  22





  21:41 21:46 21:51


 


Temperature   


 


Pulse Rate 77 75 81


 


Respiratory   





Rate   


 


Blood Pressure   


 


O2 Sat by Pulse 100 99 98





Oximetry   














  22





  21:56 22:01 22:06


 


Temperature   


 


Pulse Rate 75 77 79


 


Respiratory   





Rate   


 


Blood Pressure   


 


O2 Sat by Pulse 99 99 99





Oximetry   














  22





  22:11 22:16 22:21


 


Temperature   


 


Pulse Rate 83 74 79


 


Respiratory   





Rate   


 


Blood Pressure   


 


O2 Sat by Pulse 100 100 100





Oximetry   














  22





  22:26 22:31 22:36


 


Temperature   


 


Pulse Rate 78 79 74


 


Respiratory   





Rate   


 


Blood Pressure   


 


O2 Sat by Pulse 96 98 100





Oximetry   














  22





  22:41 22:46 22:51


 


Temperature   


 


Pulse Rate 82 78 86


 


Respiratory   





Rate   


 


Blood Pressure   


 


O2 Sat by Pulse 98 100 98





Oximetry   














  22





  22:56 23:01 23:06


 


Temperature   


 


Pulse Rate 89 84 86


 


Respiratory   





Rate   


 


Blood Pressure   


 


O2 Sat by Pulse 99 98 99





Oximetry   














  22





  23:11 23:16 23:21


 


Temperature   


 


Pulse Rate 67 82 77


 


Respiratory   





Rate   


 


Blood Pressure   


 


O2 Sat by Pulse 100 99 99





Oximetry   














  22





  23:26 23:31 23:36


 


Temperature   


 


Pulse Rate 79 75 76


 


Respiratory   





Rate   


 


Blood Pressure   


 


O2 Sat by Pulse 98 98 98





Oximetry   














  22





  23:41 00:12 00:15


 


Temperature   


 


Pulse Rate 86 75 80


 


Respiratory   





Rate   


 


Blood Pressure   


 


O2 Sat by Pulse 99 98 98





Oximetry   














  22





  00:20 00:25 00:29


 


Temperature   


 


Pulse Rate 72 79 65


 


Respiratory   





Rate   


 


Blood Pressure   105/69


 


O2 Sat by Pulse 98 99 





Oximetry   














  22





  00:30 00:35 00:40


 


Temperature 98.2 F  


 


Pulse Rate 78 88 84


 


Respiratory   





Rate   


 


Blood Pressure   


 


O2 Sat by Pulse 99 98 99





Oximetry   














  22





  00:45 00:50 00:55


 


Temperature   


 


Pulse Rate 81 74 79


 


Respiratory   





Rate   


 


Blood Pressure   


 


O2 Sat by Pulse 98 98 98





Oximetry   














  22





  01:00 01:05 01:10


 


Temperature   


 


Pulse Rate 86 84 85


 


Respiratory   





Rate   


 


Blood Pressure   


 


O2 Sat by Pulse 99 100 99





Oximetry   














  22





  01:15 01:20 01:25


 


Temperature   


 


Pulse Rate 83 90 77


 


Respiratory   





Rate   


 


Blood Pressure   


 


O2 Sat by Pulse 99 97 98





Oximetry   














  22





  01:37 01:42 01:47


 


Temperature   


 


Pulse Rate 88 83 90


 


Respiratory   





Rate   


 


Blood Pressure   


 


O2 Sat by Pulse 98 98 98





Oximetry   














  22





  01:52 01:55 01:57


 


Temperature   


 


Pulse Rate 82 32 L 95 H


 


Respiratory   





Rate   


 


Blood Pressure   112/74


 


O2 Sat by Pulse 99 79 L 98





Oximetry   














  22





  02:02 02:07 02:10


 


Temperature   


 


Pulse Rate 90 81 82


 


Respiratory   





Rate   


 


Blood Pressure   


 


O2 Sat by Pulse 98 97 93





Oximetry   














  22





  02:12 02:17 02:22


 


Temperature   


 


Pulse Rate 83 78 76


 


Respiratory   





Rate   


 


Blood Pressure   


 


O2 Sat by Pulse 96 96 97





Oximetry   














  22





  02:27 02:32 02:37


 


Temperature   


 


Pulse Rate 73 70 81


 


Respiratory   





Rate   


 


Blood Pressure   


 


O2 Sat by Pulse 97 97 96





Oximetry   














  22





  02:42 02:47 02:52


 


Temperature   


 


Pulse Rate 72 69 77


 


Respiratory   





Rate   


 


Blood Pressure   


 


O2 Sat by Pulse 97 97 97





Oximetry   














  22





  02:57 03:02 03:07


 


Temperature   


 


Pulse Rate 73 74 74


 


Respiratory   





Rate   


 


Blood Pressure   


 


O2 Sat by Pulse 97 96 98





Oximetry   














  22





  03:12 03:17 03:22


 


Temperature   


 


Pulse Rate 74 70 73


 


Respiratory   





Rate   


 


Blood Pressure   


 


O2 Sat by Pulse 97 97 97





Oximetry   














  22





  03:27 03:32 03:37


 


Temperature   


 


Pulse Rate 70 78 71


 


Respiratory   





Rate   


 


Blood Pressure   


 


O2 Sat by Pulse 97 98 94





Oximetry   














  22





  03:42 03:47 03:52


 


Temperature   


 


Pulse Rate 83 68 84


 


Respiratory   





Rate   


 


Blood Pressure   


 


O2 Sat by Pulse 96 97 98





Oximetry   














  22





  03:57 04:02 04:07


 


Temperature   


 


Pulse Rate 87 74 72


 


Respiratory   





Rate   


 


Blood Pressure   


 


O2 Sat by Pulse 99 99 98





Oximetry   














  22





  04:12 04:17 04:22


 


Temperature   


 


Pulse Rate 84 85 84


 


Respiratory   





Rate   


 


Blood Pressure   


 


O2 Sat by Pulse 100 100 99





Oximetry   














  22





  04:27 04:32 04:37


 


Temperature   


 


Pulse Rate 86 81 80


 


Respiratory   





Rate   


 


Blood Pressure   


 


O2 Sat by Pulse 99 99 99





Oximetry   














  22





  04:42 04:47 04:59


 


Temperature   


 


Pulse Rate 79 75 77


 


Respiratory   





Rate   


 


Blood Pressure   


 


O2 Sat by Pulse 99 98 99





Oximetry   














  22





  05:04 05:09 05:14


 


Temperature   


 


Pulse Rate 82 78 76


 


Respiratory   





Rate   


 


Blood Pressure   


 


O2 Sat by Pulse 98 98 99





Oximetry   














  22





  05:19 05:24 05:29


 


Temperature   


 


Pulse Rate 88 87 85


 


Respiratory   





Rate   


 


Blood Pressure   


 


O2 Sat by Pulse 99 98 98





Oximetry   














  22





  05:34 05:39 05:44


 


Temperature   


 


Pulse Rate 85 86 94 H


 


Respiratory   





Rate   


 


Blood Pressure   


 


O2 Sat by Pulse 99 99 99





Oximetry   














  22





  05:49 05:54 05:55


 


Temperature   


 


Pulse Rate 86 82 82


 


Respiratory   





Rate   


 


Blood Pressure   


 


O2 Sat by Pulse 98 94 85





Oximetry   














  22





  06:00 06:03 06:08


 


Temperature   


 


Pulse Rate 70 132 H 83


 


Respiratory   





Rate   


 


Blood Pressure   


 


O2 Sat by Pulse 0 L 92 99





Oximetry   














  22





  06:14 06:19 06:24


 


Temperature   


 


Pulse Rate 90 94 H 98 H


 


Respiratory   





Rate   


 


Blood Pressure   


 


O2 Sat by Pulse 98 100 100





Oximetry   














  22





  06:29 06:33 06:34


 


Temperature   


 


Pulse Rate 89  78


 


Respiratory  18 





Rate   


 


Blood Pressure   


 


O2 Sat by Pulse 100  100





Oximetry   














  22





  06:39 06:44 06:49


 


Temperature   


 


Pulse Rate 82 80 77


 


Respiratory   





Rate   


 


Blood Pressure   


 


O2 Sat by Pulse 99 98 97





Oximetry   














  22





  06:54 06:59 07:04


 


Temperature   


 


Pulse Rate 89 68 71


 


Respiratory   





Rate   


 


Blood Pressure   


 


O2 Sat by Pulse 98 97 98





Oximetry   














  22





  07:09 07:14 07:19


 


Temperature   


 


Pulse Rate 72 73 71


 


Respiratory   





Rate   


 


Blood Pressure   


 


O2 Sat by Pulse 98 98 98





Oximetry   














  22





  07:24 07:29 07:33


 


Temperature   


 


Pulse Rate 77 71 


 


Respiratory   18





Rate   


 


Blood Pressure   


 


O2 Sat by Pulse 98 99 





Oximetry   














  22





  07:34 07:39 07:44


 


Temperature   


 


Pulse Rate 89 62 66


 


Respiratory   





Rate   


 


Blood Pressure   


 


O2 Sat by Pulse 98 99 98





Oximetry   














  22





  07:49 07:54 07:59


 


Temperature   


 


Pulse Rate 69 70 82


 


Respiratory   





Rate   


 


Blood Pressure   


 


O2 Sat by Pulse 98 99 99





Oximetry   














  22





  08:04 08:09 08:14


 


Temperature   


 


Pulse Rate 63 74 79


 


Respiratory   





Rate   


 


Blood Pressure   


 


O2 Sat by Pulse 99 99 100





Oximetry   














  22





  08:19 08:24 08:29


 


Temperature   


 


Pulse Rate 74 93 H 74


 


Respiratory   





Rate   


 


Blood Pressure   


 


O2 Sat by Pulse 99 100 100





Oximetry   














  22





  08:33 08:34 08:43


 


Temperature   


 


Pulse Rate 75 99 H 60


 


Respiratory   





Rate   


 


Blood Pressure   


 


O2 Sat by Pulse 86 84 83 L





Oximetry   














  22





  08:48 08:53 08:58


 


Temperature   


 


Pulse Rate 81 78 84


 


Respiratory   





Rate   


 


Blood Pressure   


 


O2 Sat by Pulse 100 100 99





Oximetry   














  22





  09:03 09:08 09:13


 


Temperature   


 


Pulse Rate 92 H 75 80


 


Respiratory   





Rate   


 


Blood Pressure   


 


O2 Sat by Pulse 100 100 99





Oximetry   














  22





  09:18


 


Temperature 


 


Pulse Rate 76


 


Respiratory 





Rate 


 


Blood Pressure 


 


O2 Sat by Pulse 100





Oximetry 














- Exam


Breasts: deferred


Cardiovascular: Regular rate


Lungs: Normal air movement


Abdomen: Present: normal appearance


Uterine Contraction Pattern: Irregular


Uterine Contraction Intensity: Moderate





- Labs


Labs: 


                                  Abnormal Labs











  22





  17:54


 


Mono % (Auto)  11.5 H








                         Laboratory Results - last 24 hr











  22





  10:37 10:37 10:43


 


WBC   5.4 


 


RBC   4.19 


 


Hgb   11.8 


 


Hct   36.0 


 


MCV   86 


 


MCH   28 


 


MCHC   33 


 


RDW   15.0 


 


Plt Count   196 


 


Lymph % (Auto)   


 


Mono % (Auto)   


 


Eos % (Auto)   


 


Baso % (Auto)   


 


Lymph # (Auto)   


 


Mono # (Auto)   


 


Eos # (Auto)   


 


Baso # (Auto)   


 


Seg Neutrophils %   


 


Seg Neutrophils #   


 


Syphilis IgG/IgM Ab  Nonreactive  


 


SARS-CoV-2 (PCR)   


 


Blood Type    O POSITIVE


 


Antibody Screen    Negative














  22





  11:27 17:54 17:54


 


WBC   5.5 


 


RBC   4.28 


 


Hgb   11.9 


 


Hct   37.1 


 


MCV   87 


 


MCH   28 


 


MCHC   32 


 


RDW   14.8 


 


Plt Count   200 


 


Lymph % (Auto)   29.2 


 


Mono % (Auto)   11.5 H 


 


Eos % (Auto)   1.0 


 


Baso % (Auto)   0.6 


 


Lymph # (Auto)   1.6 


 


Mono # (Auto)   0.6 


 


Eos # (Auto)   0.1 


 


Baso # (Auto)   0.0 


 


Seg Neutrophils %   57.7 


 


Seg Neutrophils #   3.2 


 


Syphilis IgG/IgM Ab    Nonreactive


 


SARS-CoV-2 (PCR)  Negative  


 


Blood Type   


 


Antibody Screen

## 2022-06-20 NOTE — EVENT NOTE
Date: 06/20/22


Patient was irregular contractions presently on 4 milliunits of Pitocin will 

increase Pitocin once nursing staff  is adequate for monitoring.

## 2022-06-21 LAB
HCT VFR BLD CALC: 33.9 % (ref 30.3–42.9)
HGB BLD-MCNC: 11.4 GM/DL (ref 10.1–14.3)

## 2022-06-21 PROCEDURE — 10907ZC DRAINAGE OF AMNIOTIC FLUID, THERAPEUTIC FROM PRODUCTS OF CONCEPTION, VIA NATURAL OR ARTIFICIAL OPENING: ICD-10-PCS | Performed by: OBSTETRICS & GYNECOLOGY

## 2022-06-21 PROCEDURE — 00HU33Z INSERTION OF INFUSION DEVICE INTO SPINAL CANAL, PERCUTANEOUS APPROACH: ICD-10-PCS | Performed by: OBSTETRICS & GYNECOLOGY

## 2022-06-21 PROCEDURE — 3E0P7VZ INTRODUCTION OF HORMONE INTO FEMALE REPRODUCTIVE, VIA NATURAL OR ARTIFICIAL OPENING: ICD-10-PCS | Performed by: OBSTETRICS & GYNECOLOGY

## 2022-06-21 PROCEDURE — 3E0R3BZ INTRODUCTION OF ANESTHETIC AGENT INTO SPINAL CANAL, PERCUTANEOUS APPROACH: ICD-10-PCS | Performed by: OBSTETRICS & GYNECOLOGY

## 2022-06-21 RX ADMIN — IBUPROFEN SCH: 800 TABLET, FILM COATED ORAL at 22:15

## 2022-06-21 RX ADMIN — IBUPROFEN SCH MG: 800 TABLET, FILM COATED ORAL at 02:38

## 2022-06-21 RX ADMIN — DOCUSATE SODIUM SCH MG: 100 CAPSULE, LIQUID FILLED ORAL at 10:32

## 2022-06-21 RX ADMIN — Medication SCH EACH: at 10:32

## 2022-06-21 NOTE — PROGRESS NOTE
Assessment and Plan


 patient doing well, no complaints. Breast feeding baby with good latch. lochia 

scant, fundus firm, VSSAF, post delivery H&H to be drawn @ 1416.








- Patient Problems


(1)  (normal spontaneous vaginal delivery)


Current Visit: Yes   Status: Acute   


Plan to address problem: 


continue postpartum pathway


anticipate d/c home tomorrow.








Subjective





- Subjective


Date of service: 22


Principal diagnosis: postpartum day 0 <12hrs from delivery; 


Interval history: 


EDC Calculations by LMP: 2022








Past Medical History:


   Reviewed history from 2021 and no changes required:


      Anemia - takes iron


      Asthma - exercise-induced


      Mirgraines - f/u with neurologist





Past Surgical History:


   Reviewed history from 2021 and no changes required:


      negative

















Risk Factors: 


Smoked Tobacco Use:  Never smoker


Smokeless Tobacco Use:  Never


HIV High Risk Behavior:  no


Seatbelt Use:  100 %





No Dietary Counseling Reason: pn yes





Alcohol Use:  no





Drug Use:  yes


   Drug of Choice:  marijuana


   Comments/Other Substances:  Occasional use 








Past Medical History 


Anesthesia Complications: negative


Anemia: negative


Autoimmune Disorder: negative


Bleeding Disorder: negative


Blood Transfusions: negative


Breast Disease: negative


Diabetes: negative


Heart Disease: negative


Hypertension: negative


Hepatitis/Liver Disease: negative


Kidney Disease/UTI: negative


Neurologic/Epilepsy/Migraines: negative


Phlebitis/Varicosities: negative


Psychiatric: negative


Pulmonary Disease/Asthma: negative


Thyroid Disease: negative


Hospitalizations: negative


Surgery (Non-gyn): negative


Abnormal PAP: negative


Uterine Anomaly: negative


Uterine Surgery (not C/S): negative


Other Gynecologic Problems: negative





Social Hx: Patient is single





Smoking History:


Patient has never smoked.








Infection History 


Hx of STD: chlamydia


HIV Risk Eval: no


Personal hx. of genital herpes: no


Infection History Comments: Also notes h/o gonorrhea





Genetic History 


 Congenital Heart Defect:


    Mom: no


Canavan Disease:


    Mom: no


Thalassemia


    Mom: no


Neural Tube Defect


    Mom: no


Down's Syndrome


    Mom: no


Shiva-Sachs


    Mom: no


Sickle Cell Disease/Trait


    Mom: no


Hemophilia


    Mom: no


Muscular Dystrophy


    Mom: no


Cystic Fibrosis


    Mom: no


Magdiel Chorea


    Mom: no


Mental Retardation


    Mom: no


Fragile X


    Mom: no


Other Genetic/Chromosomal Disorder


    Mom: no


Child w/other birth defect


    Mom: no


Active Medications:


EYE DROPS () 


SINGULAIR TABLET (MONTELUKAST SODIUM TABS) 


FLONASE SENSIMIST SUSPENSION (FLUTICASONE FUROATE SUSP) 


AMITRIPTYLINE HCL TABLET (AMITRIPTYLINE HCL TABS) 


HYDROXYZINE HCL TABLET (HYDROXYZINE HCL TABS) 


ZOLOFT 50 MG ORAL TABLET (SERTRALINE HCL) 





Current Allergies:


* SHELLFISH (Critical)


* WATERMELON (Critical)


* NUTS (Critical)


* MUSTARD (Critical)


* LATEX (Critical)





Patient reports: appetite normal, voiding normally, pain well controlled, a

mbulating normally, no dizzy ambulation, no nauseated


: doing well, nursing well





Objective





- Vital Signs


Latest vital signs: 


                                   Vital Signs











  Temp Pulse Resp BP BP Pulse Ox Pulse Ox


 


 22 06:20        98


 


 22 04:25  98.5 F  93 H  18   99/59  97 


 


 22 04:20        97


 


 22 03:35   97 H   104/56   


 


 22 03:34   98 H     96 


 


 22 03:33   97 H     94 


 


 22 03:29   93 H     96 


 


 22 03:24   99 H     96 


 


 22 03:19   104 H     96 


 


 22 03:15   97 H   112/60   


 


 22 03:14   99 H     97 


 


 22 03:12  99.2 F      


 


 22 03:09   95 H     95 


 


 22 03:04   101 H     95 


 


 22 02:59   105 H     96 


 


 22 02:55   105 H   111/73   94 


 


 22 02:54   105 H     96 


 


 22 02:49   103 H     96 


 


 22 02:44   106 H     97 


 


 22 02:40   93 H   110/69   


 


 22 02:39   102 H     96 


 


 22 02:34   103 H     96 


 


 22 02:29   100 H     97 


 


 22 02:24   104 H     97 


 


 22 02:19   103 H     96 


 


 22 02:16   109 H     94 


 


 22 02:15   108 H   116/69   


 


 22 02:14   107 H     96 


 


 22 02:09   104 H     96 


 


 22 02:04   114 H     97 


 


 22 01:59   109 H     96 


 


 22 01:54   114 H   117/64   97 


 


 22 01:49   109 H     98 


 


 22 01:44   124 H     98 


 


 22 01:41   88     88 


 


 22 01:39   127 H     100 


 


 22 01:34   111 H     97 


 


 22 01:29   129 H     100 


 


 22 01:24   112 H     97 


 


 22 01:23   61     88 


 


 22 01:20   104 H   117/72   


 


 22 01:19   100 H     97 


 


 22 01:17   102 H     92 


 


 22 01:14   98 H     97 


 


 22 01:10       89 


 


 22 01:09   111 H     98 


 


 22 01:05   95 H   124/73   


 


 22 01:04   96 H     99 


 


 22 00:59   100 H     97 


 


 22 00:54   99 H     96 


 


 22 00:49  98.1 F  105 H  18    97 


 


 22 00:44   92 H     98 


 


 22 00:39   99 H     98 


 


 22 00:34   96 H     98 


 


 22 00:29   104 H     97 


 


 22 00:24   97 H     99 


 


 22 00:19   94 H   112/71   98 


 


 22 00:14   100 H     98 


 


 22 00:09   87     98 


 


 22 00:04   87   118/76   96 


 


 22 23:59   102 H     96 


 


 22 23:56   99 H     93 


 


 22 23:54   101 H     98 


 


 22 23:51   82   114/77   


 


 22 23:49   85     97 


 


 22 23:44   95 H     98 


 


 22 23:39   94 H     97 


 


 22 23:34   91 H   117/73   97 


 


 22 23:29   76     97 


 


 22 23:24   78     95 


 


 22 23:23   88     94 


 


 22 23:20   80   114/72   


 


 22 23:19   84     96 


 


 22 23:14   99 H     95 


 


 22 23:09  98.5 F  89     98 


 


 0620/22 23:06   100 H   112/76   


 


 0620/22 23:04   90     97 


 


 0620/22 22:59   100 H     98 


 


 0620/22 22:54   100 H     96 


 


 2022 22:50   96 H   103/65   


 


 062022 22:49   99 H     96 


 


 2022 22:44   88     97 


 


 062022 22:39   87     98 


 


 062022 22:35   86   97/53   


 


 062022 22:34   84     96 


 


 062022 22:29   88     96 


 


 062022 22:24   91 H     97 


 


 062022 22:19   94 H     98 


 


 2022 22:14   102 H     98 


 


 2022 22:09   97 H     98 


 


 2022 22:06   91 H   115/68   


 


 2022 22:04   68     98 


 


 062022 21:59   89     96 


 


 2022 21:54   81     99 


 


 2022 21:50   94 H   112/71   


 


 2022 21:49   94 H     99 


 


 2022 21:44   90     98 


 


 20/22 21:39   78     98 


 


 0620/22 21:34   64   106/64   98 


 


 20/22 21:29   76     99 


 


 20/22 21:24   64     99 


 


 20/22 21:19   81   103/67   98 


 


 0620/22 21:17   86   100/65   


 


 20/22 21:15   67   101/65   


 


 20/22 21:14   83     99 


 


 20/22 21:13   71   98/62   


 


 20/22 21:11   75   95/59   


 


 0620/22 21:09   80   101/64   99 


 


 0620/22 21:07   77   97/60   


 


 0620/22 21:05   77   98/58   


 


 0620/22 21:04   75     98 


 


 06/20/22 21:03   83   97/62   


 


 0620/22 21:01   75   100/61   


 


 0620/22 20:59   82   95/61   98 


 


 0620/22 20:57   90   95/61   


 


 0620/22 20:55   77   103/63   


 


 0620/22 20:54   97 H     97 


 


 0620/22 20:53   86   101/67   


 


 0620/22 20:51   88   104/62   


 


 06/20/22 20:49   75   104/74   97 


 


 062022 20:44   86     97 


 


 062022 20:42   73   116/75   


 


 062022 20:39   79     96 


 


 062022 20:34   81     97 


 


 062022 20:31   89   120/71   


 


 062022 20:29   85     96 


 


 062022 20:20   92 H     99 


 


 06 20:15   99 H     99 


 


 0620 20:10   87     96 


 


 0620 20:05  98.2 F  81     99 


 


 0620 20:04        97


 


 06 20:00   88   120/70   98 


 


 062022 19:55   89     99 


 


 06 19:50   82     97 


 


 22 19:45   85     98 


 


 22 19:40   84     97 


 


 22 19:35   95 H     98 


 


 22 19:27   85     97 


 


 22 19:23   68  17  109/68  109/68  97 


 


 22 19:22   91 H     97 


 


 22 19:17   80     99 


 


 22 19:12   78     97 


 


 06 19:07   95 H     97 


 


 22 19:02   71     95 


 


 22 19:01   82   104/63   


 


 22 18:57   83     95 


 


 22 18:55   81   103/64   


 


 22 18:53   78     91 


 


 22 18:52   79     95 


 


 22 18:47   83     98 


 


 2022 18:42   96 H     100 


 


 2022 18:37   76     99 


 


 062022 18:32   93 H     97 


 


 2022 18:27   96 H     96 


 


 2022 18:22   90     98 


 


 062022 18:17   84     98 


 


 062022 18:12   94 H     98 


 


 2022 18:07   86     98 


 


 062022 18:02   85     99 


 


 062022 17:52   90     99 


 


 062022 17:47   93 H     98 


 


 062022 17:42   87     98 


 


 062022 17:37   79     100 


 


 062022 17:32   87     96 


 


 2022 17:30   77   112/64   


 


 062022 17:27   78     97 


 


 22 17:22   79     98 


 


 22 17:17   70     97 


 


 22 17:12   87     97 


 


 22 16:57   79     97 


 


 22 16:52   86     98 


 


 22 16:47   74     98 


 


 22 16:42   95 H     99 


 


 22 16:37   76     97 


 


 22 16:32   69     98 


 


 22 16:31   73   111/65   


 


 22 16:27   71     98 


 


 22 16:22   68     97 


 


 22 16:17   74     97 


 


 22 16:12   78     98 


 


 22 16:07  98.2 F  77  18   111/68  98 


 


 22 16:04   83   111/68   


 


 22 16:02   82     99 


 


 22 15:53   76     98 


 


 22 15:48   70     97 


 


 22 15:43   76     98 


 


 22 15:38   64     98 


 


 22 15:33   74     97 


 


 22 15:30   71   115/65   


 


 22 15:28   72     98 


 


 22 15:23   68     97 


 


 22 15:18   71     98 


 


 22 15:13   71     98 


 


 22 15:08   68     97 


 


 22 15:03   71     97 


 


 22 15:00   68   105/63   


 


 22 14:58   74     97 


 


 22 14:53   75     97 


 


 22 14:48   80     98 


 


 22 14:37   74     96 


 


 22 14:32   69     97 


 


 22 14:30   75   108/67   


 


 22 14:27   76     97 


 


 22 14:22   75     97 


 


 22 14:17   78     97 


 


 22 14:12   85     97 


 


 22 14:07   86     97 


 


 22 14:02   81     98 


 


 22 13:59   85   111/74   


 


 22 13:57   82     98 


 


 22 13:52   89     98 


 


 22 13:47   94 H     98 


 


 22 13:45   89   119/84   


 


 22 13:42   94 H     98 


 


 22 13:37   84     98 


 


 22 13:32   74     97 


 


 22 13:29   85   117/79   


 


 22 13:27   88     98 


 


 22 13:22   88     97 


 


 22 13:17   95 H     99 


 


 22 13:14   82   119/77   


 


 22 13:12   75     94 


 


 22 13:07   76     100 


 


 22 13:02   71     98 


 


 22 12:59   74   114/71   


 


 22 12:57   72     98 


 


 22 12:52   79     97 


 


 22 12:47   76     97 


 


 22 12:45   73   113/66   


 


 22 12:42   79     97 


 


 22 12:37   82     100 


 


 22 12:32   81     96 


 


 22 12:31   62     61 L 


 


 22 12:26   90     85 


 


 22 12:20   86     98 


 


 22 12:15   88   127/76   98 


 


 22 12:10   80     99 


 


 22 12:05   74     96 


 


 22 12:03   73     93 


 


 22 12:00  97.4 F L  71     96 


 


 22 11:59   75   114/72   


 


 22 11:55   85     97 


 


 22 11:50   77     99 


 


 22 11:46   67   108/67   


 


 22 11:45   69     98 


 


 22 11:40   84     97 


 


 22 11:35   79     97 


 


 22 11:32   71     94 


 


 22 11:30   72   108/64   96 


 


 22 11:26   77     94 


 


 22 11:25   71     95 


 


 22 11:20   67     89 


 


 22 11:15   111 H     88 


 


 22 11:14       84 


 


 22 11:09       81 L 


 


 22 11:08   89     98 


 


 22 11:03   76     96 


 


 22 11:00   69   107/64   


 


 22 10:58   73     97 


 


 22 10:56   77     92 


 


 22 10:53   84     99 


 


 22 10:48   63     98 


 


 22 10:45   73   107/66   92 


 


 22 10:43   81     97 


 


 22 10:38   88     97 


 


 22 10:33   73     95 


 


 22 10:30   83     94 


 


 22 10:29   80   114/85   


 


 22 10:28   82     98 


 


 22 10:24   74     93 


 


 22 10:23   75     94 


 


 22 10:18   93 H     97 


 


 22 10:15   90     94 


 


 22 10:14   84   112/77   


 


 22 10:13   87     98 


 


 22 10:08   95 H     100 


 


 22 10:07    16    


 


 22 10:03   83     99 


 


 22 09:58   86     99 


 


 22 09:53   75     99 


 


 22 09:48   74     100 


 


 22 09:43   84     100 


 


 22 09:38   90     99 


 


 22 09:35   62     84 


 


 22 09:33   81     100 


 


 22 09:28   83     99 


 


 22 09:23   83     100 


 


 22 09:18   76     100 


 


 22 09:13   80     99 


 


 22 09:08   75     100 


 


 22 09:03   92 H     100 


 


 22 08:58   84     99 


 


 22 08:53   78     100 


 


 22 08:48   81     100 


 


 22 08:43   60     83 L 


 


 22 08:34   99 H     84 


 


 22 08:33   75     86 


 


 22 08:30  98.1 F   16    


 


 22 08:29   74     100 


 


 22 08:24   93 H     100 


 


 22 08:19   74     99 


 


 22 08:14   79     100 


 


 22 08:09   74     99 








                                Intake and Output











 22





 23:59 07:59 15:59


 


Intake Total 56.4 360 


 


Output Total  2600 


 


Balance 56.4 -2240 


 


Intake:   


 


  IV 56.4  


 


    AMPICILLIN/NS 1 GM/50 ML 50  





    1 gm In 50 ml @ 100 mls/   





    hr IV Q4H [facility] Rx#:   





    [id number]   


 


    PITOCin/NS 30 UNIT/500ML 6.4  





    30 units In 500 ml @ 4   





    mls/hr IV TITR [facility] Rx#:   





    [id number]   


 


  Intake, Free [place]  360 


 


Output:   


 


  Urine  2600 


 


    Indwelling Catheter  1100 


 


    Void  1500 


 


Other:   


 


  Total, Output Amount  1000 


 


  Estimated Blood Loss  200 














- Exam


Breasts: Present: normal, breastfeeding


Cardiovascular: Present: Regular rate


Lungs: Present: Clear to auscultation, Normal air movement


Abdomen: Present: normal appearance, soft


Vulva: both: laceration/episiotomy


Uterus: Present: normal, firm, fundal height below umbilicus


Extremities: Present: normal


Deep Tendon Reflex Grade: Normal +2

## 2022-06-21 NOTE — EVENT NOTE
Date: 22 (Vaginal pressure)


Pt with c/o feeling contractions and intense vaginal pressure. Cervical exam 

. Anesthesia called for re-dose. Will continue with Pitocin per protocol. 

Anticipate .

## 2022-06-21 NOTE — PROGRESS NOTE
Labor Epidural





- Labor Epidural


Start Time: 20:43


Stop Time: 20:49


Performed by:: YFN FREY


Procedure: 





Patient is requesting epidural for labor pain. H&P and labs reviewed. Procedure 

explained, questions answered, consent obtained. Patient placed in sitting 

position with monitors applied. Timeout performed immediately before start of 

procedure. Prep/drape in usual sterile fashion. Skin localized 3 mL 1% lidocaine

at L[3]-L[4] x 1 attempt.  17-gauge Touhy epidural needle advanced to FROY with 

saline at [7] cm. No blood/CSF noted via epidural needle.  Epidural catheter 

advanced to [12] cm. Negative aspiration for blood and CSF via catheter, 

negative response to test dose 3 ml 1.5% lidocaine w/ Epi. Sterile dressing 

applied followed by tape reinforcement. Patient tolerated procedure well. No 

immediate complications noted.

## 2022-06-21 NOTE — PROCEDURE NOTE
OB Delivery Note





- Delivery


Date of Delivery: 22


Assistant: ROXANA WHITT


Estimated blood loss: 200cc





- Vaginal


Delivery presentation: vertex


Delivery position: OA


Intrapartum events: meconium


Delivery induction: cervidil


Delivery augmentation: rupture of membranes, pitocin


Delivery monitor: external FHT, external uterine


Route of delivery: 


Delivery placenta: spontaneous


Delivery cord: 3 umbilical vessels


Episiotomy: none


Delivery laceration: 1st degree (Hemostatic, no repair needed)


Anesthesia: intravenous, epidural


Delivery comments: 


 of viable female infant. Infant to mother's abdomen for skin to skin. Cord 

clamped. Cut by grandmother of baby. Infant handed to [facility] nurse for evaluation 

d/t mec stained fluid. Spontaneous delivery of placenta, intact, complete, 3 

vessels noted. Perineum and vagina inspected, 1st degree noted, hemostatic, no 

repair needed. Fundus firm, normal post delivery bleeding noted.  ml. 

Apgars 8,9. Infant weight 7-2. Sponges and instruments counted X2 with RN and 

correct X2. Infant and mother left in stable condition in care of RN. 








- Infant


  ** A


Apgar at 1 minute: 8


Apgar at 5 minutes: 9


Infant Gender: Female ("Nadege", 7-2)

## 2022-06-21 NOTE — ANESTHESIA CONSULTATION
Anesthesia Consult and Med Hx


Date of service: 06/20/22





- Airway


Anesthetic Teeth Evaluation: Good


ROM Head & Neck: Adequate


Mental/Hyoid Distance: Adequate


Mallampati Class: Class II


Intubation Access Assessment: Probably Good





- Pulmonary Exam


CTA: Yes





- Cardiac Exam


Cardiac Exam: RRR





- Pre-Operative Health Status


ASA Pre-Surgery Classification: ASA2


Proposed Anesthetic Plan: Epidural





- Pulmonary


Hx Smoking: No


Hx Asthma: Yes (last use 2021 albuterol)


Hx Respiratory Symptoms: No


SOB: No


COPD: No


Home Oxygen Therapy: No


Hx Pneumonia: No


Hx Sleep Apnea: No





- Cardiovascular System


Hx Hypertension: No


Hx Coronary Artery Disease: No


Hx Heart Attack/AMI: No


Hx Angina: No


Hx Percutaneous Transluminal Coronary Angioplasty (PTCA): No


Hx Cardia Arrhythmia: No


Hx Pacemaker: No


Hx Internal Defibrillator: No


Hx Valvular Heart Disease: No


Hx Heart Murmur: No


Hx Peripheral Vascular Disease: No





- Central Nervous System


Hx Neuromuscular Disorder: No


Hx Seizures: No


CVA: No


Hx Back Pain: No


Hx Psychiatric Problems: Yes (depression, takes Zoloft 50mg)





- Gastrointestinal


Hx Ulcer: No


Hx Gastroesophageal Reflux Disease: No





- Endocrine


Hx Renal Disease: No


Hx End Stage Renal Disease: No


Hx Cirrhosis: No


Hx Liver Disease: No


Hx Insulin Dependent Diabetes: No


Hx Non-Insulin Dependent Diabetes: No


Hx Thyroid Disease: No


Hx Hypothyroidism: No


Hx Hyperthyroidism: No





- Hematic


Hx Anemia: Yes


Hx Sickle Cell Disease: No





- Other Systems


Hx Alcohol Use: No


Hx Substance Use: No


Hx Cancer: No


Hx Obesity: No

## 2022-06-21 NOTE — ANESTHESIA DAY OF SURGERY
Anesthesia Day of Surgery





- Day of Surgery


Patient Examined: Yes


Patient H&P Reviewed: Yes


Patient is NPO: Yes


Beta Blockers: No


Cardiac Clearance: No


Pulmonary Clearance: No


Charlie's Test: N/A

## 2022-06-22 VITALS — SYSTOLIC BLOOD PRESSURE: 101 MMHG | DIASTOLIC BLOOD PRESSURE: 70 MMHG

## 2022-06-22 RX ADMIN — IBUPROFEN SCH: 800 TABLET, FILM COATED ORAL at 09:51

## 2022-06-22 RX ADMIN — DOCUSATE SODIUM SCH: 100 CAPSULE, LIQUID FILLED ORAL at 09:52

## 2022-06-22 RX ADMIN — Medication SCH EACH: at 09:50

## 2022-06-22 RX ADMIN — IBUPROFEN SCH MG: 800 TABLET, FILM COATED ORAL at 06:41

## 2022-06-22 NOTE — DISCHARGE SUMMARY
Providers





- Providers


Date of Admission: 


22 11:31





Date of discharge: 22


Attending physician: 


JOSELO IRAHETA





Primary care physician: 


JOSELO IRAHETA








Hospitalization


Reason for admission: IOL


Condition: Good


Pertinent studies: 


 post delivery H&H 11.4/33.9





Procedures: 


 





Hospital course: 


 uncomplicated  and postpartum course





Disposition: 01 HOME / SELF CARE / HOMELESS


Final Discharge Diagnosis (Prints w/discharge instructions): vaginal birth


Time spent for discharge: 20





- Discharge Diagnoses


(1)  (normal spontaneous vaginal delivery)


Status: Acute   





Core Measure Documentation





- Palliative Care


Palliative Care/ Comfort Measures: Not Applicable





- Core Measures


Any of the following diagnoses?: none





Exam





- Constitutional


Vitals: 


                                        











Temp Pulse Resp BP Pulse Ox


 


 98.2 F   80   18   95/57   96 


 


 22 01:16  22 01:16  22 01:16  22 01:16  22 01:16











General appearance: Present: no acute distress, well-nourished





- EENT


Eyes: Present: PERRL


ENT: hearing intact, clear oral mucosa





- Neck


Neck: Present: supple, normal ROM





- Respiratory


Respiratory effort: normal


Respiratory: bilateral: CTA





- Cardiovascular


Rhythm: regular


Heart Sounds: Absent: rub, click





- Extremities


Extremities: No edema


Peripheral Pulses: within normal limits





- Abdominal


General gastrointestinal: Present: soft, non-tender, non-distended, normal bowel

sounds


Female genitourinary: Present: normal





- Integumentary


Integumentary: Present: clear, warm, dry





- Musculoskeletal


Musculoskeletal: gait normal, strength equal bilaterally





- Psychiatric


Psychiatric: appropriate mood/affect, intact judgment & insight





- Neurologic


Neurologic: CNII-XII intact, moves all extremities





- Additional findings


Additional findings: 


 lochia scant, fundus firm, breastfeeding








Plan


Activity: no restrictions


Diet: regular


Follow up with: 


JOSELO IRAHETA MD [Primary Care Provider] - 6 Weeks (Congratulations!  

Please call [phone] to schedule your postpartum visit in 4-6 weeks. Call 

for any questions or concerns.)


Prescriptions: 


Ibuprofen [Motrin] 800 mg PO Q8HR PRN #20 tablet


 PRN Reason: Pain, Moderate (4-6)